# Patient Record
Sex: MALE | Race: ASIAN | URBAN - METROPOLITAN AREA
[De-identification: names, ages, dates, MRNs, and addresses within clinical notes are randomized per-mention and may not be internally consistent; named-entity substitution may affect disease eponyms.]

---

## 2019-04-17 ENCOUNTER — TELEPHONE (OUTPATIENT)
Dept: UROLOGY | Facility: CLINIC | Age: 34
End: 2019-04-17

## 2019-04-17 DIAGNOSIS — Z31.41 FERTILITY TESTING: Primary | ICD-10-CM

## 2019-04-17 NOTE — TELEPHONE ENCOUNTER
Patient notified that I placed his SA order. He will call the andrology lab @ [phone] to schedule an appointment. Patient agreed with the plan.      Nir Oh MA

## 2019-04-17 NOTE — TELEPHONE ENCOUNTER
[facility] Call Center    Phone Message    May a detailed message be left on voicemail: yes    Reason for Call: Other: Pt called to make new infertility appt. Please place SA orders and contact pt when they have been submitted.     Action Taken: Message routed to:  [facility]

## 2019-04-20 ENCOUNTER — PRE VISIT (OUTPATIENT)
Dept: UROLOGY | Facility: CLINIC | Age: 34
End: 2019-04-20

## 2019-04-20 NOTE — TELEPHONE ENCOUNTER
MEDICAL RECORDS REQUEST   [facility]  Urology Clinic  [address] [place] [address]  PHONE: [phone]  Fax: [phone]        FUTURE VISIT INFORMATION                                                   Jose Noguera, : 1985 scheduled for future visit at [facility]    APPOINTMENT INFORMATION:    Date: 2019    Provider:  KENJI RAMIREZ    Reason for Visit/Diagnosis: INFERTILITY    REFERRAL INFORMATION:    Referring provider:  SELF    Specialty: SELF    Referring providers clinic:  SELF    Clinic contact number:  SELF    RECORDS REQUESTED FOR VISIT                                                     NOTES  STATUS/DETAILS   OFFICE NOTE from referring provider  no   OFFICE NOTE from other specialist  no   DISCHARGE SUMMARY from hospital  no   DISCHARGE REPORT from the ER  no   OPERATIVE REPORT  no   MEDICATION LIST  no   INFERTILITY     ALBUMIN  in process   FSH  in process   LAST UROLOGY/OB GYN VISIT NOTE  in process   LH  in process   SEMEN ANALYSIS (LAST 2)  in process   WILL HAVE DONE BEFORE APPT PER PT   SHBG  yes   T  yes       PRE-VISIT CHECKLIST      Record collection complete Yes   Appointment appropriately scheduled           (right time/right provider) Yes   MyChart activation If no, please explain IN PROCESS   Questionnaire complete If no, please explain IN PROCESS     Completed by: Laura Singh

## 2019-06-03 ENCOUNTER — APPOINTMENT (OUTPATIENT)
Dept: LAB | Facility: CLINIC | Age: 34
End: 2019-06-03
Payer: COMMERCIAL

## 2019-06-03 ENCOUNTER — PRE VISIT (OUTPATIENT)
Dept: UROLOGY | Facility: CLINIC | Age: 34
End: 2019-06-03

## 2019-06-03 DIAGNOSIS — Z31.41 FERTILITY TESTING: ICD-10-CM

## 2019-06-03 PROCEDURE — 89322 SEMEN ANAL STRICT CRITERIA: CPT

## 2019-06-03 NOTE — TELEPHONE ENCOUNTER
Patient coming in for fertility consult with Dr. Rodas. SA in process today. Patient chart reviewed, no need for call, all records available and ready for appointment.

## 2019-06-04 LAB
ABNORMAL SPERM: 97 MORPHOLOGY
ABSTINENCE DAYS: 5 DAYS (ref 2–7)
AGGLUTINATION: NO YES/NO
ANALYSIS TEMP - CENTIGRADE: 22.5 CENTIGRADE
CELL FRAGMENTS: ABNORMAL %
COLLECTION METHOD: ABNORMAL
COLLECTION SITE: ABNORMAL
CONSENT TO RELEASE TO PARTNER: YES
HEAD DEFECT: 97
IMMATURE SPERM: ABNORMAL %
IMMOTILE: 31 %
LAB RECEIPT TIME: ABNORMAL
LIQUEFIED: YES YES/NO
MIDPIECE DEFECT: 46
NON-PROGRESSIVE MOTILITY: 6 %
NORMAL SPERM: 3 % NORMAL FORMS (ref 4–?)
PROGRESSIVE MOTILITY: 63 % (ref 32–?)
ROUND CELLS: <0.1 MILLION/ML (ref ?–2)
SPECIMEN CONCENTRATION: 55 MILLION/ML (ref 15–?)
SPECIMEN PH: 7.6 PH (ref 7.2–?)
SPECIMEN TYPE: ABNORMAL
SPECIMEN VOL UR: 3.7 ML (ref 1.5–?)
TAIL DEFECT: 18
TIME OF ANALYSIS: ABNORMAL
TOTAL NUMBER: 204 MILLION (ref 39–?)
TOTAL PROGRESSIVE MOTILE: 129 MILLION (ref 15.6–?)
VISCOUS: NO YES/NO
VITALITY: ABNORMAL % (ref 58–?)
WBC SPECIMEN: ABNORMAL %

## 2019-06-06 ENCOUNTER — OFFICE VISIT (OUTPATIENT)
Dept: UROLOGY | Facility: CLINIC | Age: 34
End: 2019-06-06
Payer: COMMERCIAL

## 2019-06-06 ENCOUNTER — APPOINTMENT (OUTPATIENT)
Dept: LAB | Facility: CLINIC | Age: 34
End: 2019-06-06
Payer: COMMERCIAL

## 2019-06-06 VITALS
SYSTOLIC BLOOD PRESSURE: 118 MMHG | HEART RATE: 88 BPM | HEIGHT: 69 IN | WEIGHT: 237.9 LBS | DIASTOLIC BLOOD PRESSURE: 74 MMHG | BODY MASS INDEX: 35.24 KG/M2

## 2019-06-06 DIAGNOSIS — R86.8 TERATOSPERMIA: ICD-10-CM

## 2019-06-06 DIAGNOSIS — Z31.41 FERTILITY TESTING: Primary | ICD-10-CM

## 2019-06-06 LAB — FSH SERPL-ACNC: 3.2 IU/L (ref 0.7–10.8)

## 2019-06-06 ASSESSMENT — PAIN SCALES - GENERAL: PAINLEVEL: NO PAIN (0)

## 2019-06-06 ASSESSMENT — MIFFLIN-ST. JEOR: SCORE: 2024.49

## 2019-06-06 NOTE — PROGRESS NOTES
It was my pleasure to see Mr. Jose Noguera, a 33 year old male here in consultation today for fertility evaluation.      ANGIE Noguera is a 33 year old male who is healthy, no medications, no surgeries, who comes in with his partner who is 32. They had a prior pregnancy together ended in miscarriage about 2.5 years ago. They have now been trying for 15 months without success. She has had a workup and is on Clomid for the past five months, plan for IUI in one month if this is not successful.    Female partner is healthy, diabetes controlled with diet, had an anatomic exam (maybe HSG?) which was normal.    He comes with two semen analysis most recent with total progressive motile count of 129 million, morphology 3%, prior SA    4/15/2019  3mL  Progressive motility 21%  Total 116 M  (Total progressive about 25M)    6/3/2019    3.7mL  204 M  Total progressive motile 129 M  Morphology normal 3%    PAST MEDICAL HISTORY:    No chronic medical problems   Puberty normal   No associated conditions such as ED or sexual dysfunction.  No  problems.     PAST SURG HISTORY  No history of surgery.     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     Social History     Tobacco Use     Smoking status: Never Smoker     Smokeless tobacco: Never Used   Substance Use Topics     Alcohol use: Not on file     Drug use: Not on file         FAMILY HISTORY:   No known genetic, congenital issues in the family    REVIEW OF SYSTEMS:  Answers for HPI/ROS submitted by the patient on 6/2/2019   General Symptoms: No  Skin Symptoms: No  HENT Symptoms: No  EYE SYMPTOMS: No  HEART SYMPTOMS: No  LUNG SYMPTOMS: No  INTESTINAL SYMPTOMS: No  URINARY SYMPTOMS: No  REPRODUCTIVE SYMPTOMS: No  SKELETAL SYMPTOMS: No  BLOOD SYMPTOMS: No  NERVOUS SYSTEM SYMPTOMS: No  MENTAL HEALTH SYMPTOMS: No      Denies erectile dysfunction, ejaculatory problems,  "testicular pain. No vision or smell deficits, no chronic sinus or respiratory infections. No recent febrile illness, weight loss. No heat or cold intolerance, gynecomastia, or other endocrine complaints.    Otherwise, no constitutional, eye, ENT, heart, lung, GI , musculoskeletal, skin, neurologic, psychiatric, or hematologic complaints.    GONADOTOXIN EXPOSURE: Unremarkable. Otherwise negative for marijuana, heat, chemicals, pesticides, heavy metals, steroids, chemotherapy or radiation.    GENERAL PHYSICAL EXAM  /74   Pulse 88   Ht 1.753 m (5' 9\")   Wt 107.9 kg (237 lb 14.4 oz)   BMI 35.13 kg/m     Constitutional: No acute distress. Well nourished.   PSYCH: normal mood and affect.  NEURO: normal gait, no focal deficits.   EYES: anicteric, EOMI  CARDIOPULMONARY: breathing non-labored  GI: Abdomen soft, non-tender, no surgical scars, no organomegaly.  MUSCULOSKELETAL: normal limb proportions, no muscle wasting, no contractures.  SKIN: Normal virilized hair distribution, no lesions, warts or rashes over genitalia, abdomen extremities or face.  HEME/LYMPH: no ecchymosis, no lymphadenopathy in groin, no lymphedema.     EXAM:  Phallus within normal limits   Left testis descended, size is 16 cc , consistency is normal. No intra-testicular masses.   Right testis descended , size is 16 cc , consistency is normal. No intra-testicular masses.   Epididymes present, non-tender, non enlarged.   Left cord: Vas present. no varicocele noted.  Right cord: Vas present. no varicocele noted.     Rectal exam deferred.     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    Discussed ART options, IUI (intrauterine " inseminations), IVF.    No varicoceles on exam.    Discussed adjuvant semen analysis tests- he would like to do both of these.    Repeat SA with capacitation score at [facility],    Ordered sperm DNA fragmentation assay via [employer].      Kenia Clement MD  Urology Resident    I saw and examined the patient with the resident today.  I agree with the resident note and plan of care as above.     Paul Rodas MD  Urology Staff

## 2019-06-06 NOTE — LETTER
6/6/2019       RE: Jose Noguera  [address]  [facility] [address]     Dear Colleague,    Thank you for referring your patient, Jose Noguera, to the [facility] AND INST FOR PROSTATE AND UROLOGIC CANCERS at [facility]. Please see a copy of my visit note below.    It was my pleasure to see Mr. Jsoe Noguera, a 33 year old male here in consultation today for fertility evaluation.      HPI  Jose Noguera is a 33 year old male who is healthy, no medications, no surgeries, who comes in with his partner who is 32. They had a prior pregnancy together ended in miscarriage about 2.5 years ago. They have now been trying for 15 months without success. She has had a workup and is on Clomid for the past five months, plan for IUI in one month if this is not successful.    Female partner is healthy, diabetes controlled with diet, had an anatomic exam (maybe HSG?) which was normal.    He comes with two semen analysis most recent with total progressive motile count of 129 million, morphology 3%, prior SA    4/15/2019  3mL  Progressive motility 21%  Total 116 M  (Total progressive about 25M)    6/3/2019    3.7mL  204 M  Total progressive motile 129 M  Morphology normal 3%    PAST MEDICAL HISTORY:    No chronic medical problems   Puberty normal   No associated conditions such as ED or sexual dysfunction.  No  problems.     PAST SURG HISTORY  No history of surgery.     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     Social History     Tobacco Use     Smoking status: Never Smoker     Smokeless tobacco: Never Used   Substance Use Topics     Alcohol use: Not on file     Drug use: Not on file         FAMILY HISTORY:   No known genetic, congenital issues in the family    REVIEW OF SYSTEMS:  Answers for HPI/ROS submitted by the patient on 6/2/2019   General Symptoms:  "No  Skin Symptoms: No  HENT Symptoms: No  EYE SYMPTOMS: No  HEART SYMPTOMS: No  LUNG SYMPTOMS: No  INTESTINAL SYMPTOMS: No  URINARY SYMPTOMS: No  REPRODUCTIVE SYMPTOMS: No  SKELETAL SYMPTOMS: No  BLOOD SYMPTOMS: No  NERVOUS SYSTEM SYMPTOMS: No  MENTAL HEALTH SYMPTOMS: No      Denies erectile dysfunction, ejaculatory problems, testicular pain. No vision or smell deficits, no chronic sinus or respiratory infections. No recent febrile illness, weight loss. No heat or cold intolerance, gynecomastia, or other endocrine complaints.    Otherwise, no constitutional, eye, ENT, heart, lung, GI , musculoskeletal, skin, neurologic, psychiatric, or hematologic complaints.    GONADOTOXIN EXPOSURE: Unremarkable. Otherwise negative for marijuana, heat, chemicals, pesticides, heavy metals, steroids, chemotherapy or radiation.    GENERAL PHYSICAL EXAM  /74   Pulse 88   Ht 1.753 m (5' 9\")   Wt 107.9 kg (237 lb 14.4 oz)   BMI 35.13 kg/m      Constitutional: No acute distress. Well nourished.   PSYCH: normal mood and affect.  NEURO: normal gait, no focal deficits.   EYES: anicteric, EOMI  CARDIOPULMONARY: breathing non-labored  GI: Abdomen soft, non-tender, no surgical scars, no organomegaly.  MUSCULOSKELETAL: normal limb proportions, no muscle wasting, no contractures.  SKIN: Normal virilized hair distribution, no lesions, warts or rashes over genitalia, abdomen extremities or face.  HEME/LYMPH: no ecchymosis, no lymphadenopathy in groin, no lymphedema.     EXAM:  Phallus within normal limits   Left testis descended, size is 16 cc , consistency is normal. No intra-testicular masses.   Right testis descended , size is 16 cc , consistency is normal. No intra-testicular masses.   Epididymes present, non-tender, non enlarged.   Left cord: Vas present. no varicocele noted.  Right cord: Vas present. no varicocele noted.     Rectal exam deferred.     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    Discussed ART options, IUI (intrauterine inseminations), IVF.    No varicoceles on exam.    Discussed adjuvant semen analysis tests- he would like to do both of these.    Repeat SA with capacitation score at [facility],    Ordered sperm DNA fragmentation assay via [employer].      Kenia Clement MD  Urology Resident    I saw and examined the patient with the resident today.  I agree with the resident note and plan of care as above.     Paul Rodas MD  Urology Staff

## 2019-06-06 NOTE — NURSING NOTE
"Chief Complaint   Patient presents with     Consult For     infertility       Blood pressure 118/74, pulse 88, height 1.753 m (5' 9\"), weight 107.9 kg (237 lb 14.4 oz). Body mass index is 35.13 kg/m .    There is no problem list on file for this patient.      No Known Allergies    No current outpatient medications on file.       Social History     Tobacco Use     Smoking status: Never Smoker     Smokeless tobacco: Never Used   Substance Use Topics     Alcohol use: Not on file     Drug use: Not on file       Alka Elaine LPN  6/6/2019  9:45 AM     "

## 2019-06-07 ENCOUNTER — TELEPHONE (OUTPATIENT)
Dept: UROLOGY | Facility: CLINIC | Age: 34
End: 2019-06-07

## 2019-06-07 NOTE — TELEPHONE ENCOUNTER
----- Message from Jim Pandya LPN sent at 6/7/2019 10:16 AM CDT -----  [facility]  Please call [phone]  Thanks a bunch jim

## 2019-06-08 LAB
SHBG SERPL-SCNC: 28 NMOL/L (ref 11–80)
TESTOST FREE SERPL-MCNC: 9.4 NG/DL (ref 4.7–24.4)
TESTOST SERPL-MCNC: 419 NG/DL (ref 240–950)

## 2019-06-10 ENCOUNTER — TELEPHONE (OUTPATIENT)
Dept: UROLOGY | Facility: CLINIC | Age: 34
End: 2019-06-10

## 2019-06-10 NOTE — TELEPHONE ENCOUNTER
[facility] Call Center    Phone Message    May a detailed message be left on voicemail: yes    Reason for Call: Other: Sergei from [employer] calling ana laura pt has asked to post pone the testing. They will be following up with the pt in September 2019.     Action Taken: Message routed to:  [facility]  Urology

## 2019-06-11 LAB — ESTRADIOL SERPL HS-MCNC: 24 PG/ML (ref 10–40)

## 2020-03-11 ENCOUNTER — HEALTH MAINTENANCE LETTER (OUTPATIENT)
Age: 35
End: 2020-03-11

## 2021-01-03 ENCOUNTER — HEALTH MAINTENANCE LETTER (OUTPATIENT)
Age: 36
End: 2021-01-03

## 2021-04-25 ENCOUNTER — HEALTH MAINTENANCE LETTER (OUTPATIENT)
Age: 36
End: 2021-04-25

## 2021-10-10 ENCOUNTER — HEALTH MAINTENANCE LETTER (OUTPATIENT)
Age: 36
End: 2021-10-10

## 2022-05-21 ENCOUNTER — HEALTH MAINTENANCE LETTER (OUTPATIENT)
Age: 37
End: 2022-05-21

## 2022-09-18 ENCOUNTER — HEALTH MAINTENANCE LETTER (OUTPATIENT)
Age: 37
End: 2022-09-18

## 2023-06-04 ENCOUNTER — HEALTH MAINTENANCE LETTER (OUTPATIENT)
Age: 38
End: 2023-06-04